# Patient Record
Sex: FEMALE | Race: WHITE | Employment: FULL TIME | URBAN - METROPOLITAN AREA
[De-identification: names, ages, dates, MRNs, and addresses within clinical notes are randomized per-mention and may not be internally consistent; named-entity substitution may affect disease eponyms.]

---

## 2022-01-10 ENCOUNTER — OFFICE VISIT (OUTPATIENT)
Dept: OBGYN | Age: 52
End: 2022-01-10
Payer: COMMERCIAL

## 2022-01-10 ENCOUNTER — HOSPITAL ENCOUNTER (OUTPATIENT)
Age: 52
Setting detail: SPECIMEN
Discharge: HOME | End: 2022-01-10
Payer: COMMERCIAL

## 2022-01-10 VITALS
SYSTOLIC BLOOD PRESSURE: 116 MMHG | HEIGHT: 65 IN | WEIGHT: 169 LBS | BODY MASS INDEX: 28.16 KG/M2 | DIASTOLIC BLOOD PRESSURE: 78 MMHG

## 2022-01-10 DIAGNOSIS — Z01.419 WOMEN'S ANNUAL ROUTINE GYNECOLOGICAL EXAMINATION: Primary | ICD-10-CM

## 2022-01-10 PROCEDURE — 99396 PREV VISIT EST AGE 40-64: CPT | Performed by: OBSTETRICS & GYNECOLOGY

## 2022-01-10 PROCEDURE — 87624 HPV HI-RISK TYP POOLED RSLT: CPT

## 2022-01-10 PROCEDURE — 88142 CYTOPATH C/V THIN LAYER: CPT

## 2022-01-10 SDOH — ECONOMIC STABILITY: FOOD INSECURITY: WITHIN THE PAST 12 MONTHS, THE FOOD YOU BOUGHT JUST DIDN'T LAST AND YOU DIDN'T HAVE MONEY TO GET MORE.: NEVER TRUE

## 2022-01-10 SDOH — ECONOMIC STABILITY: FOOD INSECURITY: WITHIN THE PAST 12 MONTHS, YOU WORRIED THAT YOUR FOOD WOULD RUN OUT BEFORE YOU GOT MONEY TO BUY MORE.: NEVER TRUE

## 2022-01-10 ASSESSMENT — SOCIAL DETERMINANTS OF HEALTH (SDOH): HOW HARD IS IT FOR YOU TO PAY FOR THE VERY BASICS LIKE FOOD, HOUSING, MEDICAL CARE, AND HEATING?: NOT HARD AT ALL

## 2022-01-10 ASSESSMENT — PATIENT HEALTH QUESTIONNAIRE - PHQ9
SUM OF ALL RESPONSES TO PHQ9 QUESTIONS 1 & 2: 0
SUM OF ALL RESPONSES TO PHQ QUESTIONS 1-9: 0
1. LITTLE INTEREST OR PLEASURE IN DOING THINGS: 0
SUM OF ALL RESPONSES TO PHQ QUESTIONS 1-9: 0
2. FEELING DOWN, DEPRESSED OR HOPELESS: 0

## 2022-01-10 NOTE — PROGRESS NOTES
1/10/22    Debbie Crockett  1970    Chief Complaint   Patient presents with    Gynecologic Exam     Menopausal, No HRT, is sexually active, Last pap smear was 2019 normal, Last mammo 2021 normal        Debbie Crockett is a 46 y.o. female who presents today for evaluation of annual examination    Past Medical History:   Diagnosis Date    Asthma     Pulmonary emboli ([address]     bilateral lungs       Past Surgical History:   Procedure Laterality Date    APPENDECTOMY      REVISION TOTAL HIP ARTHROPLASTY         Social History     Tobacco Use    Smoking status: Never Smoker    Smokeless tobacco: Never Used   Vaping Use    Vaping Use: Never used   Substance Use Topics    Alcohol use: Yes     Comment: occasional    Drug use: No       History reviewed. No pertinent family history. Current Outpatient Medications   Medication Sig Dispense Refill    FLUoxetine (PROZAC) 40 MG capsule Take 40 mg by mouth daily.  levothyroxine (SYNTHROID) 25 MCG tablet Take 25 mcg by mouth Daily.  albuterol (PROVENTIL HFA;VENTOLIN HFA) 108 (90 BASE) MCG/ACT inhaler Inhale 2 puffs into the lungs every 6 hours as needed for Wheezing.  losartan (COZAAR) 25 MG tablet  (Patient not taking: Reported on 1/10/2022)       No current facility-administered medications for this visit. Allergies   Allergen Reactions    Levaquin [Levofloxacin In D5w]        [id number]    There is no immunization history for the selected administration types on file for this patient. Review of Systems  All other systems reviewed and are negative    /78   Ht 5' 5\" (1.651 m)   Wt 169 lb (76.7 kg)   LMP 04/12/2015   BMI 28.12 kg/m²     Physical Exam  Nursing note reviewed. Exam conducted with a chaperone present. HENT:      Head: Normocephalic. Nose: Nose normal.   Eyes:      Extraocular Movements: Extraocular movements intact. Pulmonary:      Effort: Pulmonary effort is normal.   Abdominal:      General: Abdomen is flat. Palpations: Abdomen is soft. Hernia: There is no hernia in the left inguinal area or right inguinal area. Genitourinary:     General: Normal vulva. Exam position: Lithotomy position. Pubic Area: No rash or pubic lice. Labia:         Right: No rash, tenderness, lesion or injury. Left: No rash, tenderness, lesion or injury. Urethra: No prolapse, urethral pain, urethral swelling or urethral lesion. Vagina: Normal.      Cervix: Normal.      Uterus: Normal.       Adnexa: Right adnexa normal and left adnexa normal.      Rectum: Normal.   Musculoskeletal:      Cervical back: Normal range of motion. Lymphadenopathy:      Lower Body: No right inguinal adenopathy. No left inguinal adenopathy. Skin:     General: Skin is warm. Neurological:      Mental Status: She is alert. No results found for this visit on 01/10/22. ASSESSMENT AND PLAN   Diagnosis Orders   1. Women's annual routine gynecological examination  PAP SMEAR   Mammogram and bone density  Monthly self breast examinations. Return for Annual examination.     Dee Lieberman MD

## 2022-01-16 LAB
HPV HIGH RISK: DETECTED
HPV, GENOTYPE 16: NOT DETECTED
HPV, GENOTYPE 18: DETECTED

## 2022-01-19 ENCOUNTER — TELEPHONE (OUTPATIENT)
Dept: OBGYN | Age: 52
End: 2022-01-19

## 2022-01-24 ENCOUNTER — PROCEDURE VISIT (OUTPATIENT)
Dept: OBGYN | Age: 52
End: 2022-01-24

## 2022-01-24 DIAGNOSIS — Z91.199 NO-SHOW FOR APPOINTMENT: Primary | ICD-10-CM

## 2022-02-10 ENCOUNTER — PROCEDURE VISIT (OUTPATIENT)
Dept: OBGYN | Age: 52
End: 2022-02-10

## 2022-02-10 ENCOUNTER — HOSPITAL ENCOUNTER (OUTPATIENT)
Age: 52
Setting detail: SPECIMEN
Discharge: HOME | End: 2022-02-10
Payer: COMMERCIAL

## 2022-02-10 VITALS
DIASTOLIC BLOOD PRESSURE: 89 MMHG | HEIGHT: 65 IN | BODY MASS INDEX: 28.79 KG/M2 | SYSTOLIC BLOOD PRESSURE: 138 MMHG | WEIGHT: 172.8 LBS

## 2022-02-10 DIAGNOSIS — R87.810 ASCUS WITH POSITIVE HIGH RISK HPV CERVICAL: Primary | ICD-10-CM

## 2022-02-10 DIAGNOSIS — R87.610 ASCUS WITH POSITIVE HIGH RISK HPV CERVICAL: Primary | ICD-10-CM

## 2022-02-10 PROCEDURE — 88305 TISSUE EXAM BY PATHOLOGIST: CPT | Performed by: PATHOLOGY

## 2022-02-10 PROCEDURE — 88112 CYTOPATH CELL ENHANCE TECH: CPT | Performed by: PATHOLOGY

## 2022-02-10 PROCEDURE — 87624 HPV HI-RISK TYP POOLED RSLT: CPT

## 2022-02-10 NOTE — PROGRESS NOTES
.  Patient presents for colposcopy secondary to ASCUS with high risk HPV. Speculum examination was performed and acetic acid was applied to the cervix. A small white epithelial polypoid lesion was noted at the 9 o'clock position and this was removed with a biopsy tool. ECC was performed. Adequate colposcopy.

## 2022-02-22 LAB
HPV HIGH RISK: DETECTED
HPV, GENOTYPE 16: NOT DETECTED
HPV, GENOTYPE 18: DETECTED

## 2022-03-03 ENCOUNTER — OFFICE VISIT (OUTPATIENT)
Dept: OBGYN | Age: 52
End: 2022-03-03
Payer: COMMERCIAL

## 2022-03-03 VITALS
SYSTOLIC BLOOD PRESSURE: 150 MMHG | WEIGHT: 168 LBS | HEART RATE: 87 BPM | HEIGHT: 65 IN | DIASTOLIC BLOOD PRESSURE: 87 MMHG | BODY MASS INDEX: 27.99 KG/M2

## 2022-03-03 DIAGNOSIS — R87.611 PAP SMEAR OF CERVIX WITH ASCUS, CANNOT EXCLUDE HGSIL: Primary | ICD-10-CM

## 2022-03-03 PROCEDURE — 99213 OFFICE O/P EST LOW 20 MIN: CPT | Performed by: OBSTETRICS & GYNECOLOGY

## 2022-03-03 NOTE — PROGRESS NOTES
3/3/22    Donya Nixon  1970    Chief Complaint   Patient presents with    Abnormal Pap Smear     pt had colposcopy 2/10/22 bx. was neg. and ECC was negative with positive HPV 18 . ns    Follow-up        Donya Nixon is a 46 y.o. female who presents today for evaluation of her Pap smear which was ASCUS cannot rule out high-grade    Past Medical History:   Diagnosis Date    Abnormal Pap smear of cervix     Asthma     Pulmonary emboli (HCC)     bilateral lungs       Past Surgical History:   Procedure Laterality Date    APPENDECTOMY      REVISION TOTAL HIP ARTHROPLASTY         Social History     Tobacco Use    Smoking status: Never Smoker    Smokeless tobacco: Never Used   Vaping Use    Vaping Use: Never used   Substance Use Topics    Alcohol use: Yes     Comment: occasional    Drug use: No       No family history on file. Current Outpatient Medications   Medication Sig Dispense Refill    FLUoxetine (PROZAC) 40 MG capsule Take 40 mg by mouth daily.  levothyroxine (SYNTHROID) 25 MCG tablet Take 25 mcg by mouth Daily.  albuterol (PROVENTIL HFA;VENTOLIN HFA) 108 (90 BASE) MCG/ACT inhaler Inhale 2 puffs into the lungs every 6 hours as needed for Wheezing.  losartan (COZAAR) 25 MG tablet  (Patient not taking: Reported on 1/10/2022)       No current facility-administered medications for this visit. Allergies   Allergen Reactions    Levaquin [Levofloxacin In D5w]        [id number]    There is no immunization history for the selected administration types on file for this patient. Review of Systems  All other systems reviewed and are negative    BP (!) 150/87   Pulse 87   Ht 5' 5\" (1.651 m)   Wt 168 lb (76.2 kg)   LMP 04/12/2015   BMI 27.96 kg/m²     Physical Exam    No results found for this visit on 03/03/22. ASSESSMENT AND PLAN   Diagnosis Orders   1.  Pap smear of cervix with ASCUS, cannot exclude HGSIL         Return in about 6 months (around 9/3/2022) for pap, follow up

## 2022-08-31 ENCOUNTER — INITIAL CONSULT (OUTPATIENT)
Dept: ONCOLOGY | Age: 52
End: 2022-08-31
Payer: COMMERCIAL

## 2022-08-31 ENCOUNTER — HOSPITAL ENCOUNTER (OUTPATIENT)
Dept: INFUSION THERAPY | Age: 52
Discharge: HOME | End: 2022-08-31
Payer: COMMERCIAL

## 2022-08-31 VITALS
BODY MASS INDEX: 26.52 KG/M2 | WEIGHT: 159.2 LBS | TEMPERATURE: 97.5 F | OXYGEN SATURATION: 99 % | RESPIRATION RATE: 18 BRPM | HEIGHT: 65 IN | HEART RATE: 70 BPM | SYSTOLIC BLOOD PRESSURE: 133 MMHG | DIASTOLIC BLOOD PRESSURE: 90 MMHG

## 2022-08-31 DIAGNOSIS — Z86.711 HISTORY OF PULMONARY EMBOLISM: Primary | ICD-10-CM

## 2022-08-31 DIAGNOSIS — Z86.711 HISTORY OF PULMONARY EMBOLISM: ICD-10-CM

## 2022-08-31 LAB — D DIMER: <200 NG/ML(DDU)

## 2022-08-31 PROCEDURE — 85613 RUSSELL VIPER VENOM DILUTED: CPT

## 2022-08-31 PROCEDURE — 85300 ANTITHROMBIN III ACTIVITY: CPT

## 2022-08-31 PROCEDURE — 85305 CLOT INHIBIT PROT S TOTAL: CPT

## 2022-08-31 PROCEDURE — 85303 CLOT INHIBIT PROT C ACTIVITY: CPT

## 2022-08-31 PROCEDURE — 86147 CARDIOLIPIN ANTIBODY EA IG: CPT

## 2022-08-31 PROCEDURE — 99204 OFFICE O/P NEW MOD 45 MIN: CPT | Performed by: INTERNAL MEDICINE

## 2022-08-31 PROCEDURE — 36415 COLL VENOUS BLD VENIPUNCTURE: CPT

## 2022-08-31 PROCEDURE — 99211 OFF/OP EST MAY X REQ PHY/QHP: CPT

## 2022-08-31 PROCEDURE — 86146 BETA-2 GLYCOPROTEIN ANTIBODY: CPT

## 2022-08-31 PROCEDURE — 85379 FIBRIN DEGRADATION QUANT: CPT

## 2022-08-31 NOTE — PROGRESS NOTES
Patient Name:  Sheldon Chowdhury  Patient :  1970  Patient MRN:  [id number]     Primary Oncologist: Marie Dupree MD  Referring Provider: Sai Granda MD     Date of Service: 2022      Reason for Consult: To evaluate the patient with pulmonary embolism. Chief Complaint:    Chief Complaint   Patient presents with    New Patient     Patient's active problem list:       Pulmonary embolism    HPI:   [facility] GerdaUmu Marshall is a 26-year-old very pleasant female with medical history significant for anxiety/depression, asthma, history of pulmonary embolism, osteoarthritis, history of skin cancer and allergic rhinitis, referred to me on 2022 for evaluation of her pulmonary embolism. She stated that she was diagnosed with pulmonary embolism in 2011 and she has been on anticoagulation therapy since then (initially was coumadin and later change it to xarelto). She stated that her clot burden at that time was small and she didn't require any intervention. No DVT was found at that time. She thought her PE was due to prolong immobilization from long trip. She had some hypercoagulable work ups at that time and she was told that she had some mutation. In view of her PE, she was referred to me for further evaluation. She doesn't have recurrent VTE. Her maternal grandmother has history of DVT. Upon review of her chart, I found hypercoagulable work ups in 2011. She has negative factor V leiden mutation, negative prothrombin gene mutation, normal homocysteine level and normal cardiolipin antibody level). 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 She doesn't have any significant symptoms at today visit. Past Medical History:     Significant for  1. Anxiety/depression  2. Bronchial asthma  3. Osteoarthritis  4. History of pulmonary embolism in July 2011  5. History of basal cell skin cancer  6. Allergic rhinitis    Past Surgery History:    Significant for  1. Appendicectomy  2. Skin cancer excision  3. Hip surgery in 12/2021    Social History:   She denies smoking or illicit drug abuse. She socially drinks alcohol. Family History:    Significant for breast cancer in her mother. Allergies   Allergen Reactions    Levaquin [Levofloxacin In D5w]        Current Outpatient Medications on File Prior to Visit   Medication Sig Dispense Refill    Multiple Vitamin (MULTI-DAY PO) Take by mouth      rivaroxaban (XARELTO) 15 MG TABS tablet Take 15 mg by mouth      FLUoxetine (PROZAC) 40 MG capsule Take 40 mg by mouth daily. levothyroxine (SYNTHROID) 25 MCG tablet Take 50 mcg by mouth Daily      albuterol (PROVENTIL HFA;VENTOLIN HFA) 108 (90 BASE) MCG/ACT inhaler Inhale 2 puffs into the lungs every 6 hours as needed for Wheezing. No current facility-administered medications on file prior to visit. Review of Systems:  Constitutional:  No weight loss, No fever, No chills, No night sweats. Energy level good. Eyes:  No diplopia, No transient or permanent loss of vision, No scotomata. ENT / Mouth:  No epistaxis, No dysphagia, No hoarseness, No oral ulcers, No gingival bleeding. No sore throat, No postnasal drip, No nasal drip, No mouth pain, No sinus pain, No tinnitus, Normal hearing. Cardiovascular:  No chest pain, No palpitations, No syncope, No upper extremity edema, No lower extremity edema, No calf discomfort. Respiratory:  No cough. No hemoptysis, No pleurisy, No wheezing, No dyspnea. Breast:  No breast mass, No pain, No nipple discharge, No change in size, No change in shape. Gastrointestinal:  No abdominal pain, No abdominal cramping, No nausea, No vomiting, No constipation, No diarrhea, No hematochezia, No melena, No jaundice, No dyspepsia, No dysphagia.   Urinary:  No dysuria, No hematuria, No urinary incontinence. Gynecological:  No vaginal discharge, No suprapubic pain, No abnormal vaginal bleeding. Musculoskeletal:  No muscle pain, No swollen joints, No joint redness, No bone pain, No spine tenderness. Skin:  No rash, No nodules, No pruritus, No lesions. Neurologic:  No confusion, No seizures, No syncope, No tremor, No speech change, No headache, No hiccups, No abnormal gait, No sensory changes, No weakness. Psychiatric:  No depression, No anxiety, Concentration normal.  Endocrine:  No polyuria, No polydipsia, No hot flashes, No thyroid symptoms. Hematologic:  No epistaxis, No gingival bleeding, No petechiae, No ecchymosis. Lymphatic:  No lymphadenopathy, No lymphedema. Allergy / Immunologic:  No eczema, No frequent mucous infections, No frequent respiratory infections, No recurrent urticarial, No frequent skin infections. Vital Signs: BP (!) 133/90 (Site: Right Upper Arm, Position: Sitting, Cuff Size: Medium Adult)   Pulse 70   Temp 97.5 °F (36.4 °C) (Temporal)   Resp 18   Ht 5' 5\" (1.651 m)   Wt 159 lb 3.2 oz (72.2 kg)   LMP 04/12/2015   SpO2 99%   BMI 26.49 kg/m²      Physical Exam:  CONSTITUTIONAL: awake, alert, cooperative, no apparent distress   EYES: pupils equal, round and reactive to light, sclera clear, normal conjunctiva  ENT: Normocephalic, without obvious abnormality, atraumatic  NECK: supple, symmetrical, no jugular venous distension, no carotid bruits   HEMATOLOGIC/LYMPHATIC: no cervical, supraclavicular or axillary lymphadenopathy   LUNGS: VBS, no wheezes, no increased work of breathing, no rhonchi, clear to auscultation, no crackles,    CARDIOVASCULAR: regular rate and rhythm, normal S1 and S2, no murmur noted  ABDOMEN: normal bowel sounds x 4, soft, non-distended, non-tender, no masses palpated, no hepatosplenomegaly   MUSCULOSKELETAL: full range of motion noted, tone is normal  NEUROLOGIC: awake, alert, oriented to name, place and time.  Motor skills grossly intact. SKIN: appears intact, normal skin color, normal texture, normal turgor, no jaundice.    EXTREMITIES: no leg swelling, no cyanosis, no LE edema, no clubbing,       Labs:  Hematology:  Lab Results   Component Value Date    WBC 3.9 (L) 04/19/2015    RBC 4.56 04/19/2015    HGB 12.0 (L) 04/19/2015    HCT 36.7 (L) 04/19/2015    MCV 80.5 04/19/2015    MCH 26.3 (L) 04/19/2015    MCHC 32.6 04/19/2015    RDW 15.7 (H) 04/19/2015     04/19/2015    MPV 8.4 04/19/2015    SEGSPCT 52.9 04/19/2015    EOSRELPCT 2.2 04/19/2015    BASOPCT 0.7 04/19/2015    LYMPHOPCT 33.8 04/19/2015    MONOPCT 10.4 (H) 04/19/2015    SEGSABS 2.1 04/19/2015    EOSABS 0.1 04/19/2015    BASOSABS 0.0 04/19/2015    LYMPHSABS 1.3 04/19/2015    MONOSABS 0.4 04/19/2015    DIFFTYPE AUTOMATED DIFFERENTIAL 04/19/2015     No results found for: ESR  Chemistry:  Lab Results   Component Value Date     04/19/2015    K 4.1 04/19/2015     04/19/2015    CO2 26 04/19/2015    BUN 9 04/19/2015    CREATININE 0.8 04/19/2015    GLUCOSE 94 07/23/2011    CALCIUM 9.1 04/19/2015    PROT 6.8 04/19/2015    LABALBU 4.3 04/19/2015    BILITOT 0.4 04/19/2015    ALKPHOS 60 04/19/2015    AST 20 04/19/2015    ALT 15 04/19/2015    LABGLOM >60 04/19/2015    GFRAA >60 04/19/2015    PHOS 3.4 07/22/2011    MG 1.8 07/22/2011     Lab Results   Component Value Date    HOMOCYSTEINE 7.6 07/20/2011     No components found for: LD  Lab Results   Component Value Date    TSHHS 5.909 (H) 07/21/2011    T4FREE 0.90 07/22/2011    FT3 2.3 07/22/2011     Immunology:  Lab Results   Component Value Date    PROT 6.8 04/19/2015     No results found for: María Solis, WESLEYLCR  No results found for: B2M  Coagulation Panel:  Lab Results   Component Value Date    PROTIME 19.1 (H) 04/19/2015    INR 1.70 04/19/2015    APTT 38.0 (H) 04/19/2015    DDIMER <200 08/31/2022     Anemia Panel:  No results found for: BWTEFRSD20, FOLATE  Tumor Markers:  No results found for: , CEA, ,

## 2022-08-31 NOTE — PROGRESS NOTES
[facility] Rooming Questions  Patient: Yaya Oswald  MRN: [id number]    Date: 8/31/2022        NP                 Dee Dee Francois, EMILE

## 2022-09-03 LAB
ANTICARDIOLIPIN IGA ANTIBODY: <10 APL
ANTICARDIOLIPIN IGG ANTIBODY: <10 GPL
BETA 2 GLYCOPROT.1 IGA AB: <10 SAU
BETA 2 GLYCOPROT.1 IGM AB: <10 SMU
BETA-2 GLYCOPROTEIN 1 IGG ANTIBODY: <10 SGU
CARDIOLIPIN AB IGM: <10 MPL

## 2022-09-04 LAB
ANTITHROMBIN ACTIVITY: 125 % (ref 76–128)
DILUTE RUSSELL VIPER VENOM TIME: 35 SEC (ref 33–44)
DRVVT 1 TO 1 MIX REFLEX ONLY: NORMAL SEC (ref 33–44)
DRVVT CONFIRMATION TEST: NORMAL RATIO
PROTEIN C ACTIVITY: 148 % (ref 83–168)
PROTEIN S ACTIVITY: 89 % (ref 57–131)

## 2022-09-07 ENCOUNTER — OFFICE VISIT (OUTPATIENT)
Dept: OBGYN | Age: 52
End: 2022-09-07
Payer: COMMERCIAL

## 2022-09-07 ENCOUNTER — HOSPITAL ENCOUNTER (OUTPATIENT)
Age: 52
Setting detail: SPECIMEN
Discharge: HOME | End: 2022-09-07
Payer: COMMERCIAL

## 2022-09-07 VITALS
WEIGHT: 159 LBS | BODY MASS INDEX: 26.49 KG/M2 | HEIGHT: 65 IN | DIASTOLIC BLOOD PRESSURE: 80 MMHG | SYSTOLIC BLOOD PRESSURE: 132 MMHG

## 2022-09-07 DIAGNOSIS — Z87.42 HISTORY OF ABNORMAL CERVICAL PAP SMEAR: Primary | ICD-10-CM

## 2022-09-07 PROCEDURE — 88142 CYTOPATH C/V THIN LAYER: CPT

## 2022-09-07 PROCEDURE — 99213 OFFICE O/P EST LOW 20 MIN: CPT

## 2022-09-07 PROCEDURE — 87624 HPV HI-RISK TYP POOLED RSLT: CPT

## 2022-09-07 ASSESSMENT — ENCOUNTER SYMPTOMS
RESPIRATORY NEGATIVE: 1
GASTROINTESTINAL NEGATIVE: 1
ABDOMINAL PAIN: 0

## 2022-09-07 NOTE — PROGRESS NOTES
9/7/22    Murtaza Stephens  1970    Chief Complaint   Patient presents with    Abnormal Pap Smear     Pt here for repeat pap. Pap Jan 2021 ASCUS +HPV 18, [place] feb 2022 Bx neg. No hx prior for abnormal paps. Murtaza Stephens is a 46 y.o. female who presents today for evaluation of 6 month repeat pap following colposcopy in February 2022. Pt's pap showed ASCUS +HPV18, biopsy negative. Pt denies concerns/complaints today    Past Medical History:   Diagnosis Date    Abnormal Pap smear of cervix     Arthritis     Asthma     Cancer ([address]     Pulmonary emboli (HCC)     bilateral lungs       Past Surgical History:   Procedure Laterality Date    APPENDECTOMY      REVISION TOTAL HIP ARTHROPLASTY         Social History     Tobacco Use    Smoking status: Never    Smokeless tobacco: Never   Vaping Use    Vaping Use: Never used   Substance Use Topics    Alcohol use: Yes     Comment: occasional    Drug use: No       Family History   Problem Relation Age of Onset    Breast Cancer Mother     Depression Father        Current Outpatient Medications   Medication Sig Dispense Refill    Multiple Vitamin (MULTI-DAY PO) Take by mouth      rivaroxaban (XARELTO) 15 MG TABS tablet Take 15 mg by mouth      FLUoxetine (PROZAC) 40 MG capsule Take 40 mg by mouth daily. levothyroxine (SYNTHROID) 25 MCG tablet Take 50 mcg by mouth Daily      albuterol (PROVENTIL HFA;VENTOLIN HFA) 108 (90 BASE) MCG/ACT inhaler Inhale 2 puffs into the lungs every 6 hours as needed for Wheezing. No current facility-administered medications for this visit.        Allergies   Allergen Reactions    Levaquin [Levofloxacin In D5w]        [id number]    Immunization History   Administered Date(s) Administered    COVID-19, [place] Vaccine, Vaccine Unspecified 12/30/2020, 01/28/2021, 01/20/2022    Influenza, FLUARIX, FLULAVAL, FLUZONE (age 10 mo+) AND AFLURIA, (age 1 y+), PF, 0.5mL 10/14/2021    Tdap (Boostrix, Adacel) 08/23/2022       Review of Systems Constitutional: Negative. Negative for fatigue and fever. Respiratory: Negative. Gastrointestinal: Negative. Negative for abdominal pain. Endocrine: Negative. Genitourinary: Negative. Negative for menstrual problem and vaginal pain. Musculoskeletal: Negative. Skin: Negative. Neurological: Negative. Negative for dizziness and headaches. Psychiatric/Behavioral: Negative. /80   Ht 5' 5\" (1.651 m)   Wt 159 lb (72.1 kg)   LMP 04/12/2015   BMI 26.46 kg/m²     Physical Exam  Vitals and nursing note reviewed. Constitutional:       General: She is not in acute distress. Appearance: Normal appearance. She is normal weight. HENT:      Head: Normocephalic and atraumatic. Pulmonary:      Effort: Pulmonary effort is normal. No respiratory distress. Abdominal:      Palpations: Abdomen is soft. Tenderness: There is no abdominal tenderness. Genitourinary:     General: Normal vulva. Exam position: Lithotomy position. Vagina: Normal.      Cervix: Normal.      Uterus: Normal.       Adnexa: Right adnexa normal and left adnexa normal.   Musculoskeletal:         General: Normal range of motion. Cervical back: Normal range of motion. Skin:     General: Skin is warm and dry. Neurological:      General: No focal deficit present. Mental Status: She is alert and oriented to person, place, and time. Psychiatric:         Mood and Affect: Mood normal.         Speech: Speech normal.         Behavior: Behavior normal.         Thought Content: Thought content normal.       No results found for this visit on 09/07/22. ASSESSMENT AND PLAN   Diagnosis Orders   1. History of abnormal cervical Pap smear  PAP SMEAR        Pap, no other questions/concerns today    Return if symptoms worsen or fail to improve.     Estelle Olivo PA-C

## 2022-09-12 LAB
HPV HIGH RISK: DETECTED
HPV, GENOTYPE 16: NOT DETECTED
HPV, GENOTYPE 18: DETECTED

## 2022-09-21 ENCOUNTER — OFFICE VISIT (OUTPATIENT)
Dept: OBGYN | Age: 52
End: 2022-09-21
Payer: COMMERCIAL

## 2022-09-21 VITALS
SYSTOLIC BLOOD PRESSURE: 126 MMHG | HEIGHT: 65 IN | BODY MASS INDEX: 26.49 KG/M2 | DIASTOLIC BLOOD PRESSURE: 82 MMHG | WEIGHT: 159 LBS

## 2022-09-21 DIAGNOSIS — R87.611 PAP SMEAR OF CERVIX WITH ASCUS, CANNOT EXCLUDE HGSIL: Primary | ICD-10-CM

## 2022-09-21 PROCEDURE — 99213 OFFICE O/P EST LOW 20 MIN: CPT | Performed by: OBSTETRICS & GYNECOLOGY

## 2022-09-21 NOTE — PROGRESS NOTES
9/21/22    Joselin Zepeda  1970    Chief Complaint   Patient presents with    Abnormal Pap Smear     Pt here to discuss abnormal pap smear and possible leep. Last pap smear was 9/7/22 ascus +hpv genotype 18. [place] 2/10/22 showed +hpv [id number] and pap 1/10/22 showed Atypical squamous cells - cannot exclude HSIL (ASC-H). Joselin Zepeda is a 46 y.o. female who presents today for evaluation of Pap smear history as above    Past Medical History:   Diagnosis Date    Abnormal Pap smear of cervix     Arthritis     Asthma     [address] [address]     Pulmonary emboli [address] [address]     bilateral lungs       Past Surgical History:   Procedure Laterality Date    APPENDECTOMY      REVISION TOTAL HIP ARTHROPLASTY         Social History     Tobacco Use    Smoking status: Never    Smokeless tobacco: Never   Vaping Use    Vaping Use: Never used   Substance Use Topics    Alcohol use: Yes     Comment: occasional    Drug use: No       Family History   Problem Relation Age of Onset    Breast Cancer Mother     Depression Father        Current Outpatient Medications   Medication Sig Dispense Refill    Multiple Vitamin (MULTI-DAY PO) Take by mouth      rivaroxaban (XARELTO) 15 MG TABS tablet Take 15 mg by mouth      FLUoxetine (PROZAC) 40 MG capsule Take 40 mg by mouth daily. levothyroxine (SYNTHROID) 25 MCG tablet Take 50 mcg by mouth Daily      albuterol (PROVENTIL HFA;VENTOLIN HFA) 108 (90 BASE) MCG/ACT inhaler Inhale 2 puffs into the lungs every 6 hours as needed for Wheezing. No current facility-administered medications for this visit.        Allergies   Allergen Reactions    Levaquin [Levofloxacin In D5w]        [id number]    Immunization History   Administered Date(s) Administered    COVID-19, [place] Vaccine, Vaccine Unspecified 12/30/2020, 01/28/2021, 01/20/2022    Influenza, FLUARIX, FLULAVAL, FLUZONE (age 10 mo+) AND AFLURIA, (age 1 y+), PF, 0.5mL 10/14/2021    Tdap (Boostrix, Adacel) 08/23/2022       Review of Systems  All other systems reviewed and are negative    /82   Ht 5' 5\" (1.651 m)   Wt 159 lb (72.1 kg)   LMP 04/12/2015   BMI 26.46 kg/m²     Physical Exam    No results found for this visit on 09/21/22. ASSESSMENT AND PLAN   Diagnosis Orders   1. Pap smear of cervix with ASCUS, cannot exclude HGSIL        Per the patient's request she desires a LEEP in the office. Return for surgery.     Bethanie Jesus MD

## 2022-09-21 NOTE — PROGRESS NOTES
Per Loulou at [facility] no prior auth required for code 17784 or 58611.   Ref # [id number]  Ready to schedule

## 2022-09-23 ENCOUNTER — HOSPITAL ENCOUNTER (OUTPATIENT)
Dept: INFUSION THERAPY | Age: 52
Discharge: HOME | End: 2022-09-23
Payer: COMMERCIAL

## 2022-09-23 ENCOUNTER — OFFICE VISIT (OUTPATIENT)
Dept: ONCOLOGY | Age: 52
End: 2022-09-23
Payer: COMMERCIAL

## 2022-09-23 VITALS
HEART RATE: 76 BPM | HEIGHT: 65 IN | OXYGEN SATURATION: 98 % | SYSTOLIC BLOOD PRESSURE: 143 MMHG | WEIGHT: 160 LBS | TEMPERATURE: 97.1 F | DIASTOLIC BLOOD PRESSURE: 79 MMHG | RESPIRATION RATE: 16 BRPM | BODY MASS INDEX: 26.66 KG/M2

## 2022-09-23 DIAGNOSIS — Z86.711 HISTORY OF PULMONARY EMBOLISM: Primary | ICD-10-CM

## 2022-09-23 PROCEDURE — 99211 OFF/OP EST MAY X REQ PHY/QHP: CPT

## 2022-09-23 PROCEDURE — 99213 OFFICE O/P EST LOW 20 MIN: CPT | Performed by: INTERNAL MEDICINE

## 2022-09-23 ASSESSMENT — PATIENT HEALTH QUESTIONNAIRE - PHQ9
SUM OF ALL RESPONSES TO PHQ9 QUESTIONS 1 & 2: 0
SUM OF ALL RESPONSES TO PHQ QUESTIONS 1-9: 0
SUM OF ALL RESPONSES TO PHQ QUESTIONS 1-9: 0
2. FEELING DOWN, DEPRESSED OR HOPELESS: 0
SUM OF ALL RESPONSES TO PHQ QUESTIONS 1-9: 0
1. LITTLE INTEREST OR PLEASURE IN DOING THINGS: 0
SUM OF ALL RESPONSES TO PHQ QUESTIONS 1-9: 0

## 2022-09-23 NOTE — PROGRESS NOTES
Patient Name:  Bashir Leon  Patient :  1970  Patient MRN:  [id number]     Primary Oncologist: Tim Rossi MD  Referring Provider: Lesa Bee MD     Date of Service: 2022       Chief Complaint:    Chief Complaint   Patient presents with    Follow-up     Patient's active problem list:       Pulmonary embolism    HPI:   Cal Dutton. Eddie Garcia is a 63-year-old very pleasant female with medical history significant for anxiety/depression, asthma, history of pulmonary embolism, osteoarthritis, history of skin cancer and allergic rhinitis, referred to me on 2022 for evaluation of her pulmonary embolism. She stated that she was diagnosed with pulmonary embolism in 2011 and she has been on anticoagulation therapy since then (initially was coumadin and later change it to xarelto). She stated that her clot burden at that time was small and she didn't require any intervention. No DVT was found at that time. She thought her PE was due to prolong immobilization from long trip. She had some hypercoagulable work ups at that time and she was told that she had some mutation. In view of her PE, she was referred to me for further evaluation. She doesn't have recurrent VTE. Her maternal grandmother has history of DVT. Upon review of her chart, I found hypercoagulable work ups in 2011. She has negative factor V leiden mutation, negative prothrombin gene mutation, normal homocysteine level and normal cardiolipin antibody level). 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 I requested missing hypercoagulable tests to complete the work ups. It was done on 22 and all the tests (antithrombin III, protein C activity, protein S activity and anti phospholipid panel) were within normal range.      On 2022, she presented to me for follow-up. She was referred to me for evaluation of her PE. I reviewed with her findings on additional hypercoagulable work ups. Since all the work ups were normal, I recommend to stop anticoagulation therapy (since it seemed to be provoked at that time and she has been on [facility] therapy for more than 11 years). We discussed about risks and benefits of stopping AC therapy. After long discussion, we have decided to stop it. I reviewed with her signs and symptoms of VTE and I asked her to contact us if she encounters any of those signs or symptoms. She doesn't have any significant symptoms at today visit. Past Medical History:     Significant for  1. Anxiety/depression  2. Bronchial asthma  3. Osteoarthritis  4. History of pulmonary embolism in July 2011  5. History of basal cell skin cancer  6. Allergic rhinitis    Past Surgery History:    Significant for  1. Appendicectomy  2. Skin cancer excision  3. Hip surgery in 12/2021    Social History:   She denies smoking or illicit drug abuse. She socially drinks alcohol. Family History:    Significant for breast cancer in her mother. Allergies   Allergen Reactions    Levaquin [Levofloxacin In D5w]      Review of Systems: \"Per interval history; otherwise 10 point ROS is negative. \"  Her energy level is good and her sleep is fine. She doesn't have fever, chills, night sweats, cough, shortness of breath, chest pain, hemoptysis or palpitations. Her bowel and bladder functions are normal. She doesn't have nausea, vomiting, abdominal pain, diarrhea, constipation, dysuria, loss of appetite or weight loss. She doesn't have neuropathy and she denies bleeding issues. She denies any pain on today's visit. No anxiety or depression. The rest of the systems are unremarkable.      Vital Signs: BP (!) 143/79 (Site: Left Upper Arm, Position: Sitting, Cuff Size: Medium Adult)   Pulse 76   Temp 97.1 °F (36.2 °C) (Infrared)   Resp 16 Ht 5' 5\" (1.651 m)   Wt 160 lb (72.6 kg)   LMP 04/12/2015   SpO2 98%   BMI 26.63 kg/m²      Physical Exam:  CONSTITUTIONAL: awake, alert, cooperative, no apparent distress   EYES: pupils equal, round and reactive to light, sclera clear, normal conjunctiva  ENT: Normocephalic, without obvious abnormality, atraumatic  NECK: supple, symmetrical, no jugular venous distension, no carotid bruits   HEMATOLOGIC/LYMPHATIC: no cervical, supraclavicular or axillary lymphadenopathy   LUNGS: VBS, no wheezes, no increased work of breathing, no rhonchi, clear to auscultation, no crackles,    CARDIOVASCULAR: regular rate and rhythm, normal S1 and S2, no murmur noted  ABDOMEN: normal bowel sounds x 4, soft, non-distended, non-tender, no masses palpated, no hepatosplenomegaly   MUSCULOSKELETAL: full range of motion noted, tone is normal  NEUROLOGIC: awake, alert, oriented to name, place and time. Motor skills grossly intact. SKIN: appears intact, normal skin color, normal texture, normal turgor, no jaundice.    EXTREMITIES: no leg swelling, no clubbing, no cyanosis, no LE edema,        Labs:  Hematology:  Lab Results   Component Value Date    WBC 3.9 (L) 04/19/2015    RBC 4.56 04/19/2015    HGB 12.0 (L) 04/19/2015    HCT 36.7 (L) 04/19/2015    MCV 80.5 04/19/2015    MCH 26.3 (L) 04/19/2015    MCHC 32.6 04/19/2015    RDW 15.7 (H) 04/19/2015     04/19/2015    MPV 8.4 04/19/2015    SEGSPCT 52.9 04/19/2015    EOSRELPCT 2.2 04/19/2015    BASOPCT 0.7 04/19/2015    LYMPHOPCT 33.8 04/19/2015    MONOPCT 10.4 (H) 04/19/2015    SEGSABS 2.1 04/19/2015    EOSABS 0.1 04/19/2015    BASOSABS 0.0 04/19/2015    LYMPHSABS 1.3 04/19/2015    MONOSABS 0.4 04/19/2015    DIFFTYPE AUTOMATED DIFFERENTIAL 04/19/2015     No results found for: ESR  Chemistry:  Lab Results   Component Value Date     04/19/2015    K 4.1 04/19/2015     04/19/2015    CO2 26 04/19/2015    BUN 9 04/19/2015    CREATININE 0.8 04/19/2015    GLUCOSE 94 07/23/2011 CALCIUM 9.1 04/19/2015    PROT 6.8 04/19/2015    LABALBU 4.3 04/19/2015    BILITOT 0.4 04/19/2015    ALKPHOS 60 04/19/2015    AST 20 04/19/2015    ALT 15 04/19/2015    LABGLOM >60 04/19/2015    GFRAA >60 04/19/2015    PHOS 3.4 07/22/2011    MG 1.8 07/22/2011     Lab Results   Component Value Date    HOMOCYSTEINE 7.6 07/20/2011     No components found for: LD  Lab Results   Component Value Date    TSHHS 5.909 (H) 07/21/2011    T4FREE 0.90 07/22/2011    FT3 2.3 07/22/2011     Immunology:  Lab Results   Component Value Date    PROT 6.8 04/19/2015     No results found for: Abran Varghese, KLFLCR  No results found for: B2M  Coagulation Panel:  Lab Results   Component Value Date    PROTIME 19.1 (H) 04/19/2015    INR 1.70 04/19/2015    APTT 38.0 (H) 04/19/2015    DDIMER <200 08/31/2022     Anemia Panel:  No results found for: Misael Cortez, FOLATE  Tumor Markers:  No results found for: , CEA, , LABCA2, PSA     Observations:  PHQ-9 Total Score: 0 (9/23/2022  1:32 PM)     Assessment   Pulmonary embolism    Plan:  Esthela Trevino. Harriet Lee is a 27-year-old very pleasant female who was diagnosed with pulmonary embolism in July 2011 and she has been on anticoagulation therapy since then (initially was coumadin and later change it to xarelto). She stated that her clot burden at that time was small and she didn't require any intervention. No DVT was found at that time. She thought her PE was due to prolong immobilization from long trip. Upon review of her chart, I found hypercoagulable work ups in 7/20/2011. She has negative factor V leiden mutation, negative prothrombin gene mutation, normal homocysteine level and normal cardiolipin antibody level). 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     I requested missing hypercoagulable tests to complete the work ups. It was done on 8/31/22 and all the tests (antithrombin III, protein C activity, protein S activity and anti phospholipid panel) were within normal range. On September 23, 2022, she presented to me for follow-up. She was referred to me for evaluation of her PE. I reviewed with her findings on additional hypercoagulable work ups. Since all the work ups were normal, I recommend to stop anticoagulation therapy (since it seemed to be provoked at that time and she has been on [facility] therapy for more than 11 years). We discussed about risks and benefits of stopping AC therapy. After long discussion, we have decided to stop it. I reviewed with her signs and symptoms of VTE and I asked her to contact us if she encounters any of those signs or symptoms. I answered all her questions and concerns for today. Recent imaging and labs were reviewed and discussed with the patient.

## 2022-09-23 NOTE — PROGRESS NOTES
MA Rooming Questions  Patient: Murtaza Stephens  MRN: [id number]    Date: 9/23/2022        1. Do you have any new issues?   no         2. Do you need any refills on medications?    no    3. Have you had any imaging done since your last visit?   no    4. Have you been hospitalized or seen in the emergency room since your last visit here?   no    5. Did the patient have a depression screening completed today?  Yes    No data recorded     PHQ-9 Given to (if applicable):               PHQ-9 Score (if applicable):                     [] Positive     [x]  Negative              Does question #9 need addressed (if applicable)                     [] Yes    []  No               Mary Jane Rivera MA

## 2022-10-12 ENCOUNTER — HOSPITAL ENCOUNTER (OUTPATIENT)
Age: 52
Setting detail: SPECIMEN
Discharge: HOME | End: 2022-10-12
Payer: COMMERCIAL

## 2022-10-12 ENCOUNTER — PROCEDURE VISIT (OUTPATIENT)
Dept: OBGYN | Age: 52
End: 2022-10-12
Payer: COMMERCIAL

## 2022-10-12 VITALS
BODY MASS INDEX: 26.66 KG/M2 | HEIGHT: 65 IN | WEIGHT: 160 LBS | DIASTOLIC BLOOD PRESSURE: 89 MMHG | SYSTOLIC BLOOD PRESSURE: 145 MMHG

## 2022-10-12 DIAGNOSIS — R87.611 PAPANICOLAOU SMEAR OF CERVIX WITH ATYPICAL SQUAMOUS CELLS CANNOT EXCLUDE HIGH GRADE SQUAMOUS INTRAEPITHELIAL LESION (ASC-H): Primary | ICD-10-CM

## 2022-10-12 DIAGNOSIS — R87.611 PAP SMEAR OF CERVIX WITH ASCUS, CANNOT EXCLUDE HGSIL: ICD-10-CM

## 2022-10-12 LAB
CONTROL: NORMAL
PREGNANCY TEST URINE, POC: NEGATIVE

## 2022-10-12 PROCEDURE — 57461 CONZ OF CERVIX W/SCOPE LEEP: CPT | Performed by: OBSTETRICS & GYNECOLOGY

## 2022-10-12 PROCEDURE — 88307 TISSUE EXAM BY PATHOLOGIST: CPT | Performed by: PATHOLOGY

## 2022-10-12 PROCEDURE — 81025 URINE PREGNANCY TEST: CPT | Performed by: OBSTETRICS & GYNECOLOGY

## 2022-10-12 PROCEDURE — 88341 IMHCHEM/IMCYTCHM EA ADD ANTB: CPT | Performed by: PATHOLOGY

## 2022-10-12 PROCEDURE — 88342 IMHCHEM/IMCYTCHM 1ST ANTB: CPT | Performed by: PATHOLOGY

## 2022-10-12 NOTE — PROGRESS NOTES
Procedure note  Preprocedure diagnosis: ASCUS cannot rule out HGSIL  Anesthesia: Local with 1% lidocaine with epinephrine  Procedure: LEEP  Speculum examination was performed. Cervix was injected with 1% lidocaine with epinephrine. 1 pass was made with a large loop. Specimen was placed in formalin. Cervix was fulgurated with ball tip cautery. Monsel's was applied. She tolerated the procedure well. Follow-up will be in 2 weeks.

## 2022-10-26 ENCOUNTER — OFFICE VISIT (OUTPATIENT)
Dept: OBGYN | Age: 52
End: 2022-10-26
Payer: COMMERCIAL

## 2022-10-26 VITALS
BODY MASS INDEX: 26.82 KG/M2 | DIASTOLIC BLOOD PRESSURE: 81 MMHG | HEIGHT: 65 IN | SYSTOLIC BLOOD PRESSURE: 125 MMHG | WEIGHT: 161 LBS

## 2022-10-26 DIAGNOSIS — R87.611 PAP SMEAR OF CERVIX WITH ASCUS, CANNOT EXCLUDE HGSIL: Primary | ICD-10-CM

## 2022-10-26 PROCEDURE — 99213 OFFICE O/P EST LOW 20 MIN: CPT | Performed by: OBSTETRICS & GYNECOLOGY

## 2022-10-26 NOTE — PROGRESS NOTES
10/26/22    Finn Hilario  1970    Chief Complaint   Patient presents with    Post-Op Check     Pt had LEEP on 10/12/22. Showed Focal mild atypia, favor mild squamous dysplasia (CIN1), Pt had spotting x 2 wks. Pt still having some spotting when she wipes. Finn Hilario is a 46 y.o. female who presents today for evaluation of history as above    Past Medical History:   Diagnosis Date    Abnormal Pap smear of cervix     Arthritis     Asthma     Cancer ([facility] [address]     Pulmonary emboli ([facility] [address]     bilateral lungs       Past Surgical History:   Procedure Laterality Date    APPENDECTOMY      REVISION TOTAL HIP ARTHROPLASTY         Social History     Tobacco Use    Smoking status: Never    Smokeless tobacco: Never   Vaping Use    Vaping Use: Never used   Substance Use Topics    Alcohol use: Yes     Comment: occasional    Drug use: No       Family History   Problem Relation Age of Onset    Breast Cancer Mother     Depression Father        Current Outpatient Medications   Medication Sig Dispense Refill    Multiple Vitamin (MULTI-DAY PO) Take by mouth      rivaroxaban (XARELTO) 15 MG TABS tablet Take 15 mg by mouth      FLUoxetine (PROZAC) 40 MG capsule Take 40 mg by mouth daily. levothyroxine (SYNTHROID) 25 MCG tablet Take 50 mcg by mouth Daily      albuterol (PROVENTIL HFA;VENTOLIN HFA) 108 (90 BASE) MCG/ACT inhaler Inhale 2 puffs into the lungs every 6 hours as needed for Wheezing. No current facility-administered medications for this visit.        Allergies   Allergen Reactions    Levaquin [Levofloxacin In D5w]        [id number]    Immunization History   Administered Date(s) Administered    COVID-19, [place] Vaccine, Vaccine Unspecified 12/30/2020, 01/28/2021, 01/20/2022    Influenza, FLUARIX, FLULAVAL, FLUZONE (age 10 mo+) AND AFLURIA, (age 1 y+), PF, 0.5mL 10/14/2021    Tdap (Boostrix, Adacel) 08/23/2022       Review of Systems  All other systems reviewed and are negative    /81   Ht 5' 5\" (1.651 m)   Wt 161 lb (73 kg)   LMP 04/12/2015   BMI 26.79 kg/m²     Physical Exam    No results found for this visit on 10/26/22. ASSESSMENT AND PLAN   Diagnosis Orders   1. Pap smear of cervix with ASCUS, cannot exclude HGSIL        Findings were discussed with the patient. Questions were answered. Return in about 6 months (around 4/26/2023) for pap.     [place] MD

## 2023-03-19 PROBLEM — Z86.711 HISTORY OF PULMONARY EMBOLISM: Status: ACTIVE | Noted: 2023-03-19

## 2023-03-24 ENCOUNTER — OFFICE VISIT (OUTPATIENT)
Dept: ONCOLOGY | Age: 53
End: 2023-03-24

## 2023-03-24 ENCOUNTER — HOSPITAL ENCOUNTER (OUTPATIENT)
Dept: INFUSION THERAPY | Age: 53
Discharge: HOME | End: 2023-03-24
Payer: COMMERCIAL

## 2023-03-24 VITALS
HEIGHT: 65 IN | TEMPERATURE: 97.2 F | OXYGEN SATURATION: 98 % | BODY MASS INDEX: 27.89 KG/M2 | DIASTOLIC BLOOD PRESSURE: 86 MMHG | SYSTOLIC BLOOD PRESSURE: 158 MMHG | HEART RATE: 70 BPM | WEIGHT: 167.4 LBS

## 2023-03-24 DIAGNOSIS — Z86.711 HISTORY OF PULMONARY EMBOLISM: Primary | ICD-10-CM

## 2023-03-24 PROCEDURE — 99211 OFF/OP EST MAY X REQ PHY/QHP: CPT

## 2023-03-24 ASSESSMENT — PATIENT HEALTH QUESTIONNAIRE - PHQ9
SUM OF ALL RESPONSES TO PHQ QUESTIONS 1-9: 0
SUM OF ALL RESPONSES TO PHQ QUESTIONS 1-9: 0
1. LITTLE INTEREST OR PLEASURE IN DOING THINGS: 0
2. FEELING DOWN, DEPRESSED OR HOPELESS: 0
SUM OF ALL RESPONSES TO PHQ QUESTIONS 1-9: 0
SUM OF ALL RESPONSES TO PHQ QUESTIONS 1-9: 0
SUM OF ALL RESPONSES TO PHQ9 QUESTIONS 1 & 2: 0

## 2023-03-24 NOTE — PROGRESS NOTES
MA Rooming Questions  Patient: Kelly Syed  MRN: [id number]    Date: 3/24/2023        1. Do you have any new issues?   no         2. Do you need any refills on medications?    no    3. Have you had any imaging done since your last visit?   no    4. Have you been hospitalized or seen in the emergency room since your last visit here?   no    5. Did the patient have a depression screening completed today?  Yes    PHQ-9 Total Score: 0 (3/24/2023  1:09 PM)       PHQ-9 Given to (if applicable):               PHQ-9 Score (if applicable):                     [] Positive     [x]  Negative              Does question #9 need addressed (if applicable)                     [] Yes    []  No               Crystal Hughes, CMA
Results   Component Value Date     04/19/2015    K 4.1 04/19/2015     04/19/2015    CO2 26 04/19/2015    BUN 9 04/19/2015    CREATININE 0.8 04/19/2015    GLUCOSE 94 07/23/2011    CALCIUM 9.1 04/19/2015    PROT 6.8 04/19/2015    LABALBU 4.3 04/19/2015    BILITOT 0.4 04/19/2015    ALKPHOS 60 04/19/2015    AST 20 04/19/2015    ALT 15 04/19/2015    LABGLOM >60 04/19/2015    GFRAA >60 04/19/2015    PHOS 3.4 07/22/2011    MG 1.8 07/22/2011     Lab Results   Component Value Date    HOMOCYSTEINE 7.6 07/20/2011     No results found for: LD  Lab Results   Component Value Date    TSHHS 5.909 (H) 07/21/2011    T4FREE 0.90 07/22/2011    FT3 2.3 07/22/2011     Immunology:  Lab Results   Component Value Date    PROT 6.8 04/19/2015     No results found for: Willard Mas, KLFLCR  No results found for: B2M  Coagulation Panel:  Lab Results   Component Value Date    PROTIME 19.1 (H) 04/19/2015    INR 1.70 04/19/2015    APTT 38.0 (H) 04/19/2015    DDIMER <200 08/31/2022     Anemia Panel:  No results found for: [id number], FOLATE  Tumor Markers:  No results found for: , CEA, , LABCA2, PSA     Observations:  PHQ-9 Total Score: 0 (3/24/2023  1:09 PM)     Assessment   Pulmonary embolism    Plan:  Oneil Arnett. Talia Mendoza is a 19-year-old very pleasant female who was diagnosed with pulmonary embolism in July 2011 and she has been on anticoagulation therapy since then (initially was coumadin and later change it to xarelto). She stated that her clot burden at that time was small and she didn't require any intervention. No DVT was found at that time. She thought her PE was due to prolong immobilization from long trip. Upon review of her chart, I found hypercoagulable work ups in 7/20/2011. She has negative factor V leiden mutation, negative prothrombin gene mutation, normal homocysteine level and normal cardiolipin antibody level).  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

## 2023-03-24 NOTE — LETTER
March 26, 2023      Angela Sanchez MD  [address]      Patient: Brittnee Mueller   MR Number: [id number]   YOB: 1970   Date of Visit: 3/24/2023       Dear Angela Sanchez:    Thank you for referring Hal Doherty to me for evaluation/treatment. Below are the relevant portions of my assessment and plan of care. If you have questions, please do not hesitate to call me. I look forward to following Heather Fuentes along with you.     Sincerely,        Marcella Swain MD

## 2023-04-26 ENCOUNTER — OFFICE VISIT (OUTPATIENT)
Dept: OBGYN | Age: 53
End: 2023-04-26
Payer: COMMERCIAL

## 2023-04-26 ENCOUNTER — HOSPITAL ENCOUNTER (OUTPATIENT)
Age: 53
Setting detail: SPECIMEN
Discharge: HOME | End: 2023-04-26
Payer: COMMERCIAL

## 2023-04-26 VITALS
DIASTOLIC BLOOD PRESSURE: 95 MMHG | SYSTOLIC BLOOD PRESSURE: 139 MMHG | WEIGHT: 167 LBS | HEIGHT: 65 IN | BODY MASS INDEX: 27.82 KG/M2

## 2023-04-26 DIAGNOSIS — R87.611 PAP SMEAR OF CERVIX WITH ASCUS, CANNOT EXCLUDE HGSIL: Primary | ICD-10-CM

## 2023-04-26 PROCEDURE — 87624 HPV HI-RISK TYP POOLED RSLT: CPT

## 2023-04-26 PROCEDURE — 99213 OFFICE O/P EST LOW 20 MIN: CPT | Performed by: OBSTETRICS & GYNECOLOGY

## 2023-04-26 PROCEDURE — 88142 CYTOPATH C/V THIN LAYER: CPT

## 2023-04-26 SDOH — ECONOMIC STABILITY: INCOME INSECURITY: HOW HARD IS IT FOR YOU TO PAY FOR THE VERY BASICS LIKE FOOD, HOUSING, MEDICAL CARE, AND HEATING?: NOT HARD AT ALL

## 2023-04-26 SDOH — ECONOMIC STABILITY: FOOD INSECURITY: WITHIN THE PAST 12 MONTHS, YOU WORRIED THAT YOUR FOOD WOULD RUN OUT BEFORE YOU GOT MONEY TO BUY MORE.: NEVER TRUE

## 2023-04-26 SDOH — ECONOMIC STABILITY: FOOD INSECURITY: WITHIN THE PAST 12 MONTHS, THE FOOD YOU BOUGHT JUST DIDN'T LAST AND YOU DIDN'T HAVE MONEY TO GET MORE.: NEVER TRUE

## 2023-04-26 SDOH — ECONOMIC STABILITY: HOUSING INSECURITY
IN THE LAST 12 MONTHS, WAS THERE A TIME WHEN YOU DID NOT HAVE A STEADY PLACE TO SLEEP OR SLEPT IN A SHELTER (INCLUDING NOW)?: NO

## 2023-04-26 NOTE — PROGRESS NOTES
m)   Wt 167 lb (75.8 kg)   LMP 04/12/2015   BMI 27.79 kg/m²     Physical Exam  Exam conducted with a chaperone present. Abdominal:      Hernia: There is no hernia in the left inguinal area or right inguinal area. Genitourinary:     General: Normal vulva. Exam position: Lithotomy position. Pubic Area: No rash or pubic lice. Labia:         Right: No rash, tenderness, lesion or injury. Left: No rash, tenderness, lesion or injury. Urethra: No prolapse, urethral pain, urethral swelling or urethral lesion. Vagina: Normal.   Lymphadenopathy:      Lower Body: No right inguinal adenopathy. No left inguinal adenopathy. No results found for this visit on 04/26/23. ASSESSMENT AND PLAN   Diagnosis Orders   1. Pap smear of cervix with ASCUS, cannot exclude HGSIL  PAP SMEAR      Repeat Pap smear in 6 months. No follow-ups on file.     Reece Obando MD

## 2023-05-01 LAB
HPV HIGH RISK: NOT DETECTED
HPV, GENOTYPE 16: NOT DETECTED
HPV, GENOTYPE 18: NOT DETECTED

## 2023-10-30 ENCOUNTER — OFFICE VISIT (OUTPATIENT)
Dept: OBGYN | Age: 53
End: 2023-10-30
Payer: COMMERCIAL

## 2023-10-30 ENCOUNTER — HOSPITAL ENCOUNTER (OUTPATIENT)
Age: 53
Setting detail: SPECIMEN
Discharge: HOME | End: 2023-10-30
Payer: COMMERCIAL

## 2023-10-30 VITALS
WEIGHT: 163 LBS | HEIGHT: 66 IN | RESPIRATION RATE: 18 BRPM | SYSTOLIC BLOOD PRESSURE: 133 MMHG | DIASTOLIC BLOOD PRESSURE: 95 MMHG | BODY MASS INDEX: 26.2 KG/M2

## 2023-10-30 DIAGNOSIS — R87.611 PAP SMEAR OF CERVIX WITH ASCUS, CANNOT EXCLUDE HGSIL: ICD-10-CM

## 2023-10-30 DIAGNOSIS — Z11.51 SCREENING FOR HUMAN PAPILLOMAVIRUS (HPV): ICD-10-CM

## 2023-10-30 DIAGNOSIS — Z01.419 ENCOUNTER FOR WELL WOMAN EXAM WITH ROUTINE GYNECOLOGICAL EXAM: Primary | ICD-10-CM

## 2023-10-30 DIAGNOSIS — Z12.4 CERVICAL CANCER SCREENING: ICD-10-CM

## 2023-10-30 PROCEDURE — 99396 PREV VISIT EST AGE 40-64: CPT | Performed by: OBSTETRICS & GYNECOLOGY

## 2023-10-30 PROCEDURE — 87624 HPV HI-RISK TYP POOLED RSLT: CPT

## 2023-10-30 PROCEDURE — 88142 CYTOPATH C/V THIN LAYER: CPT

## 2023-10-30 RX ORDER — ROSUVASTATIN CALCIUM 10 MG/1
10 TABLET, COATED ORAL DAILY
COMMUNITY

## 2023-11-03 LAB
HPV HIGH RISK: NOT DETECTED
HPV, GENOTYPE 16: NOT DETECTED
HPV, GENOTYPE 18: NOT DETECTED

## 2023-11-16 LAB
ALBUMIN: 4.8 G/DL
ALP BLD-CCNC: 72 U/L
ALT SERPL-CCNC: 28 U/L
ANION GAP SERPL CALCULATED.3IONS-SCNC: 2.3 MMOL/L
AST SERPL-CCNC: 23 U/L
BILIRUB SERPL-MCNC: 0.5 MG/DL (ref 0.1–1.4)
BUN BLDV-MCNC: 14 MG/DL
CALCIUM SERPL-MCNC: 9.7 MG/DL
CHLORIDE BLD-SCNC: 103 MMOL/L
CHOLESTEROL, TOTAL: 179 MG/DL
CHOLESTEROL/HDL RATIO: NORMAL
CO2: 26 MMOL/L
CREAT SERPL-MCNC: 0.7 MG/DL
ESTIMATED AVERAGE GLUCOSE: NORMAL
GFR, ESTIMATED: 103
GLUCOSE BLD-MCNC: 92 MG/DL
HBA1C MFR BLD: 5.8 %
HDLC SERPL-MCNC: 55 MG/DL (ref 35–70)
LDL CHOLESTEROL: 103
NONHDLC SERPL-MCNC: NORMAL MG/DL
POTASSIUM SERPL-SCNC: 4.9 MMOL/L
SODIUM BLD-SCNC: 141 MMOL/L
TOTAL PROTEIN: 6.9 G/DL (ref 6.4–8.2)
TRIGL SERPL-MCNC: 107 MG/DL
TSH SERPL DL<=0.05 MIU/L-ACNC: 1.16 UIU/ML
VLDLC SERPL CALC-MCNC: 21 MG/DL

## 2024-05-31 ENCOUNTER — HOSPITAL ENCOUNTER (OUTPATIENT)
Age: 54
Setting detail: SPECIMEN
Discharge: HOME | End: 2024-05-31
Payer: COMMERCIAL

## 2024-05-31 ENCOUNTER — OFFICE VISIT (OUTPATIENT)
Dept: OBGYN | Age: 54
End: 2024-05-31
Payer: COMMERCIAL

## 2024-05-31 VITALS
SYSTOLIC BLOOD PRESSURE: 130 MMHG | DIASTOLIC BLOOD PRESSURE: 86 MMHG | BODY MASS INDEX: 26.2 KG/M2 | WEIGHT: 163 LBS | HEIGHT: 66 IN

## 2024-05-31 DIAGNOSIS — Z12.4 CERVICAL CANCER SCREENING: Primary | ICD-10-CM

## 2024-05-31 PROCEDURE — 87624 HPV HI-RISK TYP POOLED RSLT: CPT

## 2024-05-31 PROCEDURE — 99459 PELVIC EXAMINATION: CPT

## 2024-05-31 PROCEDURE — 99212 OFFICE O/P EST SF 10 MIN: CPT

## 2024-05-31 RX ORDER — BUSPIRONE HYDROCHLORIDE 5 MG/1
5 TABLET ORAL 3 TIMES DAILY
COMMUNITY

## 2024-05-31 RX ORDER — LOSARTAN POTASSIUM 25 MG/1
25 TABLET ORAL DAILY
COMMUNITY

## 2024-05-31 SDOH — ECONOMIC STABILITY: INCOME INSECURITY: HOW HARD IS IT FOR YOU TO PAY FOR THE VERY BASICS LIKE FOOD, HOUSING, MEDICAL CARE, AND HEATING?: NOT HARD AT ALL

## 2024-05-31 SDOH — ECONOMIC STABILITY: FOOD INSECURITY: WITHIN THE PAST 12 MONTHS, YOU WORRIED THAT YOUR FOOD WOULD RUN OUT BEFORE YOU GOT MONEY TO BUY MORE.: NEVER TRUE

## 2024-05-31 SDOH — ECONOMIC STABILITY: FOOD INSECURITY: WITHIN THE PAST 12 MONTHS, THE FOOD YOU BOUGHT JUST DIDN'T LAST AND YOU DIDN'T HAVE MONEY TO GET MORE.: NEVER TRUE

## 2024-05-31 ASSESSMENT — ENCOUNTER SYMPTOMS
SHORTNESS OF BREATH: 0
GASTROINTESTINAL NEGATIVE: 1
CHEST TIGHTNESS: 0
DIARRHEA: 0
RESPIRATORY NEGATIVE: 1
VOMITING: 0
ABDOMINAL PAIN: 0
NAUSEA: 0
CONSTIPATION: 0

## 2024-05-31 ASSESSMENT — PATIENT HEALTH QUESTIONNAIRE - PHQ9
SUM OF ALL RESPONSES TO PHQ QUESTIONS 1-9: 0
SUM OF ALL RESPONSES TO PHQ9 QUESTIONS 1 & 2: 0
SUM OF ALL RESPONSES TO PHQ QUESTIONS 1-9: 0
1. LITTLE INTEREST OR PLEASURE IN DOING THINGS: NOT AT ALL
2. FEELING DOWN, DEPRESSED OR HOPELESS: NOT AT ALL
SUM OF ALL RESPONSES TO PHQ QUESTIONS 1-9: 0
SUM OF ALL RESPONSES TO PHQ QUESTIONS 1-9: 0

## 2024-05-31 NOTE — PROGRESS NOTES
5/31/24    Mary Macias  1970    Chief Complaint   Patient presents with    Other     Last Pap 10/30/23 neg, HPV neg, last mammo 10/26/23-neg, menopausal, still has ovaries-no HRT, sexually active. Needs 6 month repeat Pap.  No questions or concerns         Mary Macias is a 53 y.o. female who presents today for 6 month repeat pap. No concerns, reports no bleeding or pain.     Pap 10/2023 - NILM, HPV-  Pap 04/2023 - NILM, HPV-  [place] 10/2022 - Focal mild atypia, favor mild squamous dysplasia   (CIN1)  Pap 09/2022 - ASCUS, HPV+  [place] 02/2022 - Benign, HPV+  Pap 01/2022 - Cannot exclude HSIL (ASC-H), HPV+    Past Medical History:   Diagnosis Date    Abnormal Pap smear of cervix     Arthritis     Asthma     Cancer (HCC)     Hypertension     Pulmonary emboli (HCC)     bilateral lungs    Wears glasses        Past Surgical History:   Procedure Laterality Date    APPENDECTOMY      REVISION TOTAL HIP ARTHROPLASTY      WRIST SURGERY         Social History     Tobacco Use    Smoking status: Never    Smokeless tobacco: Never   Vaping Use    Vaping Use: Never used   Substance Use Topics    Alcohol use: Yes     Comment: occasional    Drug use: No       Family History   Problem Relation Age of Onset    Depression Father     Breast Cancer Mother     Diabetes Sister        Current Outpatient Medications   Medication Sig Dispense Refill    busPIRone (BUSPAR) 5 MG tablet Take 1 tablet by mouth 3 times daily      losartan (COZAAR) 25 MG tablet Take 1 tablet by mouth daily      rosuvastatin (CRESTOR) 10 MG tablet Take 1 tablet by mouth daily      Multiple Vitamin (MULTI-DAY PO) Take by mouth      FLUoxetine (PROZAC) 40 MG capsule Take 1 capsule by mouth daily      levothyroxine (SYNTHROID) 25 MCG tablet Take 2 tablets by mouth Daily      albuterol (PROVENTIL HFA;VENTOLIN HFA) 108 (90 BASE) MCG/ACT inhaler Inhale 2 puffs into the lungs every 6 hours as needed for Wheezing       No current facility-administered medications for

## 2024-06-05 LAB — HPV HIGH RISK: NOT DETECTED

## 2024-08-24 NOTE — PROGRESS NOTES
Patient Name:  Mary Macias  Patient :  1970  Patient MRN:  [id number]     Primary Oncologist: Arsenio Yee MD  Referring Provider: Danyell Farrell MD     Date of Service: 2024       Chief Complaint:    Chief Complaint   Patient presents with    Follow-up     Patient's active problem list:       Pulmonary embolism    HPI:   Mary Macias is a 53-year-old very pleasant female with medical history significant for anxiety/depression, asthma, history of pulmonary embolism, osteoarthritis, history of skin cancer and allergic rhinitis, referred to me on 2022 for evaluation of her pulmonary embolism.    She stated that she was diagnosed with pulmonary embolism in 2011 and she has been on anticoagulation therapy since then (initially was coumadin and later change it to xarelto). She stated that her clot burden at that time was small and she didn't require any intervention. No DVT was found at that time. She thought her PE was due to prolong immobilization from long trip.     She had some hypercoagulable work ups at that time and she was told that she had some mutation.     In view of her PE, she was referred to me for further evaluation.     She doesn't have recurrent VTE. Her maternal grandmother has history of DVT.     Upon review of her chart, I found hypercoagulable work ups in 2011. She has negative factor V leiden mutation, negative prothrombin gene mutation, normal homocysteine level and normal cardiolipin antibody level). 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    I requested missing hypercoagulable tests to complete the work ups. It was done on 22 and all the tests (antithrombin III, protein C activity, protein S activity and anti phospholipid panel) were within normal range.     She then developed right   04/19/2015    K 4.1 04/19/2015     04/19/2015    CO2 26 04/19/2015    BUN 9 04/19/2015    CREATININE 0.8 04/19/2015    GLUCOSE 94 07/23/2011    CALCIUM 9.1 04/19/2015    BILITOT 0.4 04/19/2015    ALKPHOS 60 04/19/2015    AST 20 04/19/2015    ALT 15 04/19/2015    LABGLOM >60 04/19/2015    GFRAA >60 04/19/2015    PHOS 3.4 07/22/2011    MG 1.8 07/22/2011     Lab Results   Component Value Date    HOMOCYSTEINE 7.6 07/20/2011     No results found for: \"LD\"  Lab Results   Component Value Date    TSHHS 5.909 (H) 07/21/2011    T4FREE 0.90 07/22/2011    FT3 2.3 07/22/2011     Immunology:  No results found for: \"SPEP\", \"ALBUMINELP\", \"LABALPH\", \"LABBETA\", \"GAMGLOB\"    No results found for: \"KAPPAUVOL\", \"LAMBDAUVOL\", \"KLFLCR\"  No results found for: \"B2M\"  Coagulation Panel:  Lab Results   Component Value Date    PROTIME 19.1 (H) 04/19/2015    INR 1.70 04/19/2015    APTT 38.0 (H) 04/19/2015    DDIMER <200 08/31/2022     Anemia Panel:  No results found for: \"[id number]\", \"FOLATE\"  Tumor Markers:  No results found for: \"\", \"CEA\", \"\", \"LABCA2\", \"PSA\"     Observations:  No data recorded     Assessment   Pulmonary embolism    Plan:  Mary Macias is a 51-year-old very pleasant female who was diagnosed with pulmonary embolism in July 2011 and she has been on anticoagulation therapy since then (initially was coumadin and later change it to xarelto). She stated that her clot burden at that time was small and she didn't require any intervention. No DVT was found at that time. She thought her PE was due to prolong immobilization from long trip.     Upon review of her chart, I found hypercoagulable work ups in 7/20/2011. She has negative factor V leiden mutation, negative prothrombin gene mutation, normal homocysteine level and normal cardiolipin antibody level). 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

## 2024-08-26 ENCOUNTER — HOSPITAL ENCOUNTER (OUTPATIENT)
Dept: INFUSION THERAPY | Age: 54
Discharge: HOME | End: 2024-08-26
Payer: COMMERCIAL

## 2024-08-26 ENCOUNTER — OFFICE VISIT (OUTPATIENT)
Dept: ONCOLOGY | Age: 54
End: 2024-08-26
Payer: COMMERCIAL

## 2024-08-26 VITALS
BODY MASS INDEX: 26.58 KG/M2 | HEART RATE: 55 BPM | WEIGHT: 165.4 LBS | DIASTOLIC BLOOD PRESSURE: 76 MMHG | TEMPERATURE: 97.9 F | HEIGHT: 66 IN | SYSTOLIC BLOOD PRESSURE: 129 MMHG | OXYGEN SATURATION: 99 %

## 2024-08-26 DIAGNOSIS — Z86.711 HISTORY OF PULMONARY EMBOLISM: Primary | ICD-10-CM

## 2024-08-26 PROCEDURE — 99214 OFFICE O/P EST MOD 30 MIN: CPT | Performed by: INTERNAL MEDICINE

## 2024-08-26 PROCEDURE — 99211 OFF/OP EST MAY X REQ PHY/QHP: CPT

## 2024-08-26 RX ORDER — MONTELUKAST SODIUM 10 MG/1
10 TABLET ORAL NIGHTLY
COMMUNITY
Start: 2024-08-01

## 2024-08-26 RX ORDER — APIXABAN 5 MG/1
TABLET, FILM COATED ORAL
COMMUNITY
Start: 2024-07-31

## 2024-08-26 RX ORDER — MULTIVITAMIN WITH FOLIC ACID 400 MCG
1 TABLET ORAL DAILY
COMMUNITY

## 2024-08-26 RX ORDER — METOPROLOL TARTRATE 25 MG/1
25 TABLET, FILM COATED ORAL DAILY
COMMUNITY
Start: 2024-08-05

## 2024-08-26 RX ORDER — FAMOTIDINE 20 MG/1
20 TABLET, FILM COATED ORAL PRN
COMMUNITY

## 2024-08-26 NOTE — PROGRESS NOTES
MA Rooming Questions  Patient: Mary Macias  MRN: [id number]    Date: 8/26/2024        1. Do you have any new issues?   yes - Patient states on 7/31 went to see Dr. Mckinney PCP for complaints of right calf pain. PCP did an US at [facility] and found 2 blood clots in right calf. Patient states on 8/2 went to  [facility] they did a ct of the chest and found a blood clot in right lung. Patient states is concerned about this.          2. Do you need any refills on medications?    no    3. Have you had any imaging done since your last visit?   yes - US 7/31, CT chest 8/2    4. Have you been hospitalized or seen in the emergency room since your last visit here?   yes - [facility] 8/2    5. Did the patient have a depression screening completed today? No    No data recorded     PHQ-9 Given to (if applicable):               PHQ-9 Score (if applicable):                     [] Positive     []  Negative              Does question #9 need addressed (if applicable)                     [] Yes    []  No               Corrie Van MA

## 2024-09-11 ENCOUNTER — TELEPHONE (OUTPATIENT)
Dept: CARDIOLOGY CLINIC | Age: 54
End: 2024-09-11

## 2024-09-27 ENCOUNTER — TELEPHONE (OUTPATIENT)
Dept: CARDIOLOGY CLINIC | Age: 54
End: 2024-09-27

## 2024-09-30 ENCOUNTER — TELEPHONE (OUTPATIENT)
Dept: CARDIOLOGY CLINIC | Age: 54
End: 2024-09-30

## 2024-10-01 ENCOUNTER — INITIAL CONSULT (OUTPATIENT)
Dept: CARDIOLOGY CLINIC | Age: 54
End: 2024-10-01
Payer: COMMERCIAL

## 2024-10-01 VITALS
BODY MASS INDEX: 25.75 KG/M2 | DIASTOLIC BLOOD PRESSURE: 62 MMHG | HEART RATE: 67 BPM | HEIGHT: 66 IN | WEIGHT: 160.2 LBS | OXYGEN SATURATION: 98 % | SYSTOLIC BLOOD PRESSURE: 116 MMHG

## 2024-10-01 DIAGNOSIS — I49.3 FREQUENT PVCS: ICD-10-CM

## 2024-10-01 DIAGNOSIS — I82.401 DEEP VEIN THROMBOSIS (DVT) OF RIGHT LOWER EXTREMITY, UNSPECIFIED CHRONICITY, UNSPECIFIED VEIN (HCC): ICD-10-CM

## 2024-10-01 DIAGNOSIS — R06.02 SHORTNESS OF BREATH: ICD-10-CM

## 2024-10-01 DIAGNOSIS — I10 PRIMARY HYPERTENSION: ICD-10-CM

## 2024-10-01 DIAGNOSIS — Z86.711 HISTORY OF PULMONARY EMBOLISM: Primary | ICD-10-CM

## 2024-10-01 DIAGNOSIS — R07.9 CHEST PAIN, UNSPECIFIED TYPE: ICD-10-CM

## 2024-10-01 PROBLEM — I82.409 DVT (DEEP VENOUS THROMBOSIS) (HCC): Status: ACTIVE | Noted: 2024-10-01

## 2024-10-01 PROCEDURE — 3078F DIAST BP <80 MM HG: CPT | Performed by: INTERNAL MEDICINE

## 2024-10-01 PROCEDURE — 93000 ELECTROCARDIOGRAM COMPLETE: CPT | Performed by: INTERNAL MEDICINE

## 2024-10-01 PROCEDURE — 3074F SYST BP LT 130 MM HG: CPT | Performed by: INTERNAL MEDICINE

## 2024-10-01 PROCEDURE — 99204 OFFICE O/P NEW MOD 45 MIN: CPT | Performed by: INTERNAL MEDICINE

## 2024-10-01 RX ORDER — FLUTICASONE PROPIONATE 50 MCG
SPRAY, SUSPENSION (ML) NASAL
COMMUNITY
Start: 2024-09-04

## 2024-10-01 NOTE — ASSESSMENT & PLAN NOTE
She has history of DVTs in the past and PE now she was on anticoagulation for 10 years and was stopped 2 years ago it appears that she developed PE and an unprovoked DVT she should be on anticoagulation for the rest of her life

## 2024-10-01 NOTE — PATIENT INSTRUCTIONS
**It is YOUR responsibilty to bring medication bottles and/or updated medication list to EACH APPOINTMENT. This will allow us to better serve you and all your healthcare needs**  Thank you for allowing us to care for you today!   We want to ensure we can follow your treatment plan and we strive to give you the best outcomes and experience possible.   If you ever have a life threatening emergency and call 911 - for an ambulance (EMS)   Our providers can only care for you at:   [facility] or [facility].   Even if you have someone take you or you drive yourself we can only care for you in a [facility]. Our providers are not setup at the other healthcare locations!   Please be informed that if you contact our office outside of normal business hours the physician on call cannot help with any scheduling or rescheduling issues, procedure instruction questions or any type of medication issue.    We advise you for any urgent/emergency that you go to the nearest emergency room!    PLEASE CALL OUR OFFICE DURING NORMAL BUSINESS HOURS    Monday - Friday   8 am to 5 pm    [place]: [phone]    [place]: [phone]    [place]:  [phone]  We are committed to providing you the best care possible.    If you receive a survey after visiting one of our offices, please take time to share your experience concerning your physician office visit.  These surveys are confidential and no health information about you is shared.    We are eager to improve for you and we are counting on your feedback to help make that happen.

## 2024-10-01 NOTE — PROGRESS NOTES
CARDIOLOGY  NOTE    Chief Complaint: DVT    HPI:   Mary is a 53 y.o. year old who has Past medical history as noted below. H/o DVt and PE in 2014 and then now had another DVt and PE in August 2024 she comes in due to abnormal Holter monitor showing episodes of PVCs she says she was not feeling well had right leg swelling out of the blue and then started having chest pain CT scan showed pulmonary embolism she is still having some discomfort and shortness of breath  She was on blood thinners for 10 years and thens stopped it 2 years ago   She was having plapitations nd had monitor showed VT and pVC  She works as a medical assiatnt at [employer]  Work up by heme was negative and she was taken off anticoagulation 2 years ago  Started beta blockers in August 2024          Current Outpatient Medications   Medication Sig Dispense Refill    fluticasone (FLONASE) 50 MCG/ACT nasal spray instill TWO SPRAYS in each nostril DAILY      ELIQUIS 5 MG TABS tablet TAKE 1 TABLET BY MOUTH TWICE DAILY for 30 days      famotidine (PEPCID) 20 MG tablet Take 1 tablet by mouth as needed      metoprolol tartrate (LOPRESSOR) 25 MG tablet Take 1 tablet by mouth daily      montelukast (SINGULAIR) 10 MG tablet Take 1 tablet by mouth nightly      Multiple Vitamin (MULTIVITAMIN) tablet Take 1 tablet by mouth daily      Ascorbic Acid (VITAMIN C PO) Take by mouth daily      Cyanocobalamin (VITAMIN B-12 ER PO) Take by mouth      busPIRone (BUSPAR) 5 MG tablet Take 1 tablet by mouth 3 times daily      losartan (COZAAR) 25 MG tablet Take 1 tablet by mouth daily      rosuvastatin (CRESTOR) 10 MG tablet Take 1 tablet by mouth daily      Multiple Vitamin (MULTI-DAY PO) Take by mouth      FLUoxetine (PROZAC) 40 MG capsule Take 1 capsule by mouth daily      levothyroxine (SYNTHROID) 25 MCG tablet Take 2 tablets by mouth Daily      albuterol (PROVENTIL HFA;VENTOLIN HFA) 108 (90 BASE) MCG/ACT inhaler Inhale 2

## 2024-10-01 NOTE — ASSESSMENT & PLAN NOTE
Blood pressure is well-controlled started on metoprolol 25 twice daily due to PVCs continue losartan 25 mg daily

## 2024-10-22 ENCOUNTER — TELEPHONE (OUTPATIENT)
Dept: CARDIOLOGY CLINIC | Age: 54
End: 2024-10-22

## 2024-10-22 NOTE — TELEPHONE ENCOUNTER
Tests preformed 10/21, Next OV 11/25    Notified pt of Normal test results, and next OV       Exercise stress test     Normal stress test ( Duke score of 7.45 ) Low risk    The stress ECG was negative for ischemia.Completed 10.1 METS    Stress Test: Overall, the patient's exercise capacity was average for their age. The patient reached stage 3 of the protocol and was stressed for 7 min and 45 sec. The patient experienced no angina during the test. The patient reported dyspnea during the stress test. Onset of symptoms occurred at stage 2 of the protocol. Symptoms began during stress and ended during recovery. The patient achieved the target heart rate. Hemodynamics are adequate for diagnosis. Blood pressure demonstrated a normal response and heart rate demonstrated a normal response to stress. The patient's heart rate recovery was normal.    Echo (TTE) complete       Left Ventricle: Normal left ventricular systolic function with a visually estimated EF of 55 - 60%. Left ventricle size is normal. Normal wall thickness. Normal wall motion. Normal diastolic function.    Tricuspid Valve: Normal RVSP. The estimated RVSP is 24 mmHg.    Image quality is adequate.

## 2025-02-06 ENCOUNTER — TRANSCRIBE ORDERS (OUTPATIENT)
Dept: ADMINISTRATIVE | Age: 55
End: 2025-02-06

## 2025-02-06 DIAGNOSIS — R14.0 ABDOMINAL BLOATING: ICD-10-CM

## 2025-02-06 DIAGNOSIS — R11.0 NAUSEA: Primary | ICD-10-CM

## 2025-02-18 ENCOUNTER — HOSPITAL ENCOUNTER (OUTPATIENT)
Dept: ULTRASOUND IMAGING | Age: 55
Discharge: HOME | End: 2025-02-18
Payer: COMMERCIAL

## 2025-02-18 ENCOUNTER — HOSPITAL ENCOUNTER (OUTPATIENT)
Dept: NUCLEAR MEDICINE | Age: 55
Discharge: HOME | End: 2025-02-18
Payer: COMMERCIAL

## 2025-02-18 VITALS — HEIGHT: 66 IN | BODY MASS INDEX: 21.86 KG/M2 | WEIGHT: 136 LBS

## 2025-02-18 DIAGNOSIS — R14.0 ABDOMINAL BLOATING: ICD-10-CM

## 2025-02-18 DIAGNOSIS — R11.0 NAUSEA: ICD-10-CM

## 2025-02-18 PROCEDURE — 3430000000 HC RX DIAGNOSTIC RADIOPHARMACEUTICAL

## 2025-02-18 PROCEDURE — 78227 HEPATOBIL SYST IMAGE W/DRUG: CPT

## 2025-02-18 PROCEDURE — 76705 ECHO EXAM OF ABDOMEN: CPT

## 2025-02-18 PROCEDURE — 2580000003 HC RX 258

## 2025-02-18 PROCEDURE — A9537 TC99M MEBROFENIN: HCPCS

## 2025-02-18 PROCEDURE — 6360000002 HC RX W HCPCS

## 2025-02-18 RX ADMIN — SODIUM CHLORIDE 1.23 MCG: 9 INJECTION, SOLUTION INTRAVENOUS at 09:39

## 2025-02-18 RX ADMIN — Medication 4.9 MILLICURIE: at 08:25

## 2025-08-19 ENCOUNTER — TELEPHONE (OUTPATIENT)
Dept: ONCOLOGY | Age: 55
End: 2025-08-19